# Patient Record
Sex: MALE | URBAN - METROPOLITAN AREA
[De-identification: names, ages, dates, MRNs, and addresses within clinical notes are randomized per-mention and may not be internally consistent; named-entity substitution may affect disease eponyms.]

---

## 2024-05-31 ENCOUNTER — HOSPITAL ENCOUNTER (INPATIENT)
Facility: MEDICAL CENTER | Age: 79
LOS: 2 days | DRG: 177 | End: 2024-06-02
Attending: INTERNAL MEDICINE | Admitting: HOSPITALIST
Payer: MEDICARE

## 2024-05-31 ENCOUNTER — APPOINTMENT (OUTPATIENT)
Dept: RADIOLOGY | Facility: MEDICAL CENTER | Age: 79
DRG: 177 | End: 2024-05-31
Attending: HOSPITALIST
Payer: MEDICARE

## 2024-05-31 ENCOUNTER — TELEPHONE (OUTPATIENT)
Dept: SLEEP MEDICINE | Facility: MEDICAL CENTER | Age: 79
End: 2024-05-31

## 2024-05-31 DIAGNOSIS — I10 ESSENTIAL HYPERTENSION: ICD-10-CM

## 2024-05-31 PROBLEM — J96.00 ACUTE RESPIRATORY FAILURE (HCC): Status: RESOLVED | Noted: 2024-05-31 | Resolved: 2024-05-31

## 2024-05-31 PROBLEM — J96.01 ACUTE HYPOXEMIC RESPIRATORY FAILURE (HCC): Status: ACTIVE | Noted: 2024-05-31

## 2024-05-31 PROBLEM — J96.01 ACUTE HYPOXEMIC RESPIRATORY FAILURE (HCC): Status: RESOLVED | Noted: 2024-05-31 | Resolved: 2024-05-31

## 2024-05-31 PROBLEM — D69.6 THROMBOCYTOPENIA (HCC): Status: ACTIVE | Noted: 2024-05-31

## 2024-05-31 PROBLEM — J12.82 PNEUMONIA DUE TO COVID-19 VIRUS: Status: ACTIVE | Noted: 2024-05-31

## 2024-05-31 PROBLEM — U07.1 PNEUMONIA DUE TO COVID-19 VIRUS: Status: ACTIVE | Noted: 2024-05-31

## 2024-05-31 PROBLEM — J96.00 ACUTE RESPIRATORY FAILURE (HCC): Status: ACTIVE | Noted: 2024-05-31

## 2024-05-31 PROBLEM — U07.1 COVID: Status: ACTIVE | Noted: 2024-05-31

## 2024-05-31 PROBLEM — I95.9 HYPOTENSION: Status: ACTIVE | Noted: 2024-05-31

## 2024-05-31 PROBLEM — R65.10 SIRS (SYSTEMIC INFLAMMATORY RESPONSE SYNDROME) (HCC): Status: ACTIVE | Noted: 2024-05-31

## 2024-05-31 LAB
D DIMER PPP IA.FEU-MCNC: 0.38 UG/ML (FEU) (ref 0–0.5)
INR PPP: 2.67 (ref 0.87–1.13)
PROTHROMBIN TIME: 29.7 SEC (ref 12–14.6)

## 2024-05-31 PROCEDURE — A9270 NON-COVERED ITEM OR SERVICE: HCPCS | Performed by: HOSPITALIST

## 2024-05-31 PROCEDURE — 85379 FIBRIN DEGRADATION QUANT: CPT

## 2024-05-31 PROCEDURE — 84145 PROCALCITONIN (PCT): CPT

## 2024-05-31 PROCEDURE — 85027 COMPLETE CBC AUTOMATED: CPT

## 2024-05-31 PROCEDURE — 83880 ASSAY OF NATRIURETIC PEPTIDE: CPT

## 2024-05-31 PROCEDURE — 71045 X-RAY EXAM CHEST 1 VIEW: CPT

## 2024-05-31 PROCEDURE — 85652 RBC SED RATE AUTOMATED: CPT

## 2024-05-31 PROCEDURE — 80048 BASIC METABOLIC PNL TOTAL CA: CPT

## 2024-05-31 PROCEDURE — 700102 HCHG RX REV CODE 250 W/ 637 OVERRIDE(OP): Performed by: HOSPITALIST

## 2024-05-31 PROCEDURE — 85610 PROTHROMBIN TIME: CPT

## 2024-05-31 PROCEDURE — 99223 1ST HOSP IP/OBS HIGH 75: CPT | Mod: AI | Performed by: HOSPITALIST

## 2024-05-31 PROCEDURE — 770022 HCHG ROOM/CARE - ICU (200)

## 2024-05-31 RX ORDER — ONDANSETRON 4 MG/1
4 TABLET, ORALLY DISINTEGRATING ORAL EVERY 4 HOURS PRN
Status: DISCONTINUED | OUTPATIENT
Start: 2024-05-31 | End: 2024-06-02 | Stop reason: HOSPADM

## 2024-05-31 RX ORDER — POLYETHYLENE GLYCOL 3350 17 G/17G
1 POWDER, FOR SOLUTION ORAL
Status: DISCONTINUED | OUTPATIENT
Start: 2024-05-31 | End: 2024-06-02 | Stop reason: HOSPADM

## 2024-05-31 RX ORDER — FLUOXETINE HYDROCHLORIDE 20 MG/1
40 CAPSULE ORAL DAILY
COMMUNITY

## 2024-05-31 RX ORDER — LEVOTHYROXINE SODIUM 0.07 MG/1
75 TABLET ORAL
COMMUNITY

## 2024-05-31 RX ORDER — OMEPRAZOLE 20 MG/1
20 CAPSULE, DELAYED RELEASE ORAL DAILY
Status: DISCONTINUED | OUTPATIENT
Start: 2024-06-01 | End: 2024-06-02 | Stop reason: HOSPADM

## 2024-05-31 RX ORDER — HYDROCHLOROTHIAZIDE 25 MG/1
12.5 TABLET ORAL DAILY
Status: ON HOLD | COMMUNITY
End: 2024-06-02

## 2024-05-31 RX ORDER — LISINOPRIL 20 MG/1
80 TABLET ORAL DAILY
Status: ON HOLD | COMMUNITY
End: 2024-06-02

## 2024-05-31 RX ORDER — LEVOTHYROXINE SODIUM 0.07 MG/1
75 TABLET ORAL
Status: DISCONTINUED | OUTPATIENT
Start: 2024-06-01 | End: 2024-06-02 | Stop reason: HOSPADM

## 2024-05-31 RX ORDER — WARFARIN SODIUM 10 MG/1
10 TABLET ORAL DAILY
COMMUNITY

## 2024-05-31 RX ORDER — ATORVASTATIN CALCIUM 10 MG/1
10 TABLET, FILM COATED ORAL DAILY
Status: DISCONTINUED | OUTPATIENT
Start: 2024-06-01 | End: 2024-06-02 | Stop reason: HOSPADM

## 2024-05-31 RX ORDER — AMOXICILLIN 250 MG
2 CAPSULE ORAL 2 TIMES DAILY
Status: DISCONTINUED | OUTPATIENT
Start: 2024-05-31 | End: 2024-06-02 | Stop reason: HOSPADM

## 2024-05-31 RX ORDER — FLUOXETINE HYDROCHLORIDE 20 MG/1
40 CAPSULE ORAL DAILY
Status: DISCONTINUED | OUTPATIENT
Start: 2024-06-01 | End: 2024-06-02 | Stop reason: HOSPADM

## 2024-05-31 RX ORDER — ONDANSETRON 2 MG/ML
4 INJECTION INTRAMUSCULAR; INTRAVENOUS EVERY 4 HOURS PRN
Status: DISCONTINUED | OUTPATIENT
Start: 2024-05-31 | End: 2024-06-02 | Stop reason: HOSPADM

## 2024-05-31 RX ORDER — ACETAMINOPHEN 325 MG/1
650 TABLET ORAL EVERY 6 HOURS PRN
Status: DISCONTINUED | OUTPATIENT
Start: 2024-05-31 | End: 2024-06-02 | Stop reason: HOSPADM

## 2024-05-31 RX ORDER — HYDROCODONE BITARTRATE AND ACETAMINOPHEN 10; 325 MG/1; MG/1
1-2 TABLET ORAL EVERY 6 HOURS PRN
COMMUNITY

## 2024-05-31 RX ORDER — ATORVASTATIN CALCIUM 10 MG/1
10 TABLET, FILM COATED ORAL NIGHTLY
COMMUNITY

## 2024-05-31 RX ORDER — PANTOPRAZOLE SODIUM 40 MG/1
40 TABLET, DELAYED RELEASE ORAL DAILY
COMMUNITY

## 2024-05-31 RX ORDER — WARFARIN SODIUM 7.5 MG/1
7.5 TABLET ORAL ONCE
Status: COMPLETED | OUTPATIENT
Start: 2024-05-31 | End: 2024-05-31

## 2024-05-31 RX ADMIN — WARFARIN SODIUM 7.5 MG: 7.5 TABLET ORAL at 23:27

## 2024-05-31 SDOH — ECONOMIC STABILITY: TRANSPORTATION INSECURITY
IN THE PAST 12 MONTHS, HAS LACK OF RELIABLE TRANSPORTATION KEPT YOU FROM MEDICAL APPOINTMENTS, MEETINGS, WORK OR FROM GETTING THINGS NEEDED FOR DAILY LIVING?: NO

## 2024-05-31 SDOH — ECONOMIC STABILITY: TRANSPORTATION INSECURITY
IN THE PAST 12 MONTHS, HAS THE LACK OF TRANSPORTATION KEPT YOU FROM MEDICAL APPOINTMENTS OR FROM GETTING MEDICATIONS?: NO

## 2024-05-31 ASSESSMENT — COGNITIVE AND FUNCTIONAL STATUS - GENERAL
SUGGESTED CMS G CODE MODIFIER DAILY ACTIVITY: CH
DAILY ACTIVITIY SCORE: 24
MOBILITY SCORE: 24
SUGGESTED CMS G CODE MODIFIER MOBILITY: CH

## 2024-05-31 ASSESSMENT — CHA2DS2 SCORE
SEX: MALE
CHA2DS2 VASC SCORE: 4
AGE 65 TO 74: NO
HYPERTENSION: YES
CHF OR LEFT VENTRICULAR DYSFUNCTION: NO
VASCULAR DISEASE: YES
DIABETES: NO
AGE 75 OR GREATER: YES
PRIOR STROKE OR TIA OR THROMBOEMBOLISM: NO

## 2024-05-31 ASSESSMENT — SOCIAL DETERMINANTS OF HEALTH (SDOH)

## 2024-05-31 ASSESSMENT — LIFESTYLE VARIABLES
AVERAGE NUMBER OF DAYS PER WEEK YOU HAVE A DRINK CONTAINING ALCOHOL: 3
HAVE PEOPLE ANNOYED YOU BY CRITICIZING YOUR DRINKING: NO
EVER FELT BAD OR GUILTY ABOUT YOUR DRINKING: NO
ON A TYPICAL DAY WHEN YOU DRINK ALCOHOL HOW MANY DRINKS DO YOU HAVE: 4
CONSUMPTION TOTAL: NEGATIVE
HOW MANY TIMES IN THE PAST YEAR HAVE YOU HAD 5 OR MORE DRINKS IN A DAY: 0
TOTAL SCORE: 0
DOES PATIENT WANT TO STOP DRINKING: NO
EVER HAD A DRINK FIRST THING IN THE MORNING TO STEADY YOUR NERVES TO GET RID OF A HANGOVER: NO
HAVE YOU EVER FELT YOU SHOULD CUT DOWN ON YOUR DRINKING: NO
TOTAL SCORE: 0
TOTAL SCORE: 0
ALCOHOL_USE: YES

## 2024-05-31 ASSESSMENT — PATIENT HEALTH QUESTIONNAIRE - PHQ9
SUM OF ALL RESPONSES TO PHQ9 QUESTIONS 1 AND 2: 0
1. LITTLE INTEREST OR PLEASURE IN DOING THINGS: NOT AT ALL
2. FEELING DOWN, DEPRESSED, IRRITABLE, OR HOPELESS: NOT AT ALL

## 2024-05-31 ASSESSMENT — PAIN DESCRIPTION - PAIN TYPE
TYPE: ACUTE PAIN
TYPE: ACUTE PAIN

## 2024-05-31 NOTE — TELEPHONE ENCOUNTER
"[facility] DIRECT ADMIT PROGRESS NOTE    Transferring facility: [facility]   Transferring provider: Meg    Chief complaint: COVID septic shock    Pertinent history & patient course:     78M treated for PNA 4 weeks ago treated with Zpac, had lingering cough but otherwise improves, came in for orthostatic hypotension. COVID+. AF, , hypotensive SBP 80s, given 2L bolus, remained hypotensive and vasopressors started. Room air.    Hx AF on coumadin INR 3.78, CLL WBC 29.5 historically 22-30    JOCELYN Cr 1.8 (baseline ~1)    CXR \"didn't show anything\"    Cardiac workup \"benign\", no changes, trop neg    CT chest: \"viral\" pneumonia    UA bland    Lactate pending    No echo prior    Pertinent imaging & lab results: see above    Consultants called prior to transfer and pertinent input from consultants: none    Code Status: Full code per transferring provider, I personally verified with the transferring provider patient's code status and the transferring provider has confirmed this with the patient.    Reason for Transfer: HLOC    Further work up or recommendations requested prior to transfer: none    Patient accepted for transfer: Yes    Accepting [facility]: [facility] to notify the admitting provider when patient arrives to the unit.    Consultants to be called upon arrival: noctensivist  Admission status: Inpatient.     [facility]    The admitting provider is the point of contact for questions or concerns regarding the patient's care.   __________  Gómez Yoo MD  Pulmonary and Critical Care Medicine  [facility]  "

## 2024-06-01 PROBLEM — R65.10 SIRS (SYSTEMIC INFLAMMATORY RESPONSE SYNDROME) (HCC): Status: RESOLVED | Noted: 2024-05-31 | Resolved: 2024-06-01

## 2024-06-01 PROBLEM — U07.1 COVID: Status: RESOLVED | Noted: 2024-05-31 | Resolved: 2024-06-01

## 2024-06-01 PROBLEM — D72.829 LEUCOCYTOSIS: Status: ACTIVE | Noted: 2024-06-01

## 2024-06-01 PROBLEM — I48.91 A-FIB (HCC): Status: ACTIVE | Noted: 2024-06-01

## 2024-06-01 LAB
ANION GAP SERPL CALC-SCNC: 13 MMOL/L (ref 7–16)
BUN SERPL-MCNC: 21 MG/DL (ref 8–22)
CALCIUM SERPL-MCNC: 8.1 MG/DL (ref 8.4–10.2)
CHLORIDE SERPL-SCNC: 105 MMOL/L (ref 96–112)
CO2 SERPL-SCNC: 18 MMOL/L (ref 20–33)
CREAT SERPL-MCNC: 0.94 MG/DL (ref 0.5–1.4)
ERYTHROCYTE [DISTWIDTH] IN BLOOD BY AUTOMATED COUNT: 52 FL (ref 35.9–50)
ERYTHROCYTE [SEDIMENTATION RATE] IN BLOOD BY WESTERGREN METHOD: 94 MM/HOUR (ref 0–20)
GFR SERPLBLD CREATININE-BSD FMLA CKD-EPI: 83 ML/MIN/1.73 M 2
GLUCOSE SERPL-MCNC: 197 MG/DL (ref 65–99)
HCT VFR BLD AUTO: 32.2 % (ref 42–52)
HGB BLD-MCNC: 10.3 G/DL (ref 14–18)
INR PPP: 2.74 (ref 0.87–1.13)
MCH RBC QN AUTO: 33.8 PG (ref 27–33)
MCHC RBC AUTO-ENTMCNC: 32 G/DL (ref 32.3–36.5)
MCV RBC AUTO: 105.6 FL (ref 81.4–97.8)
NT-PROBNP SERPL IA-MCNC: 709 PG/ML (ref 0–125)
PLATELET # BLD AUTO: 128 K/UL (ref 164–446)
PMV BLD AUTO: 10.5 FL (ref 9–12.9)
POTASSIUM SERPL-SCNC: 4.4 MMOL/L (ref 3.6–5.5)
PROCALCITONIN SERPL-MCNC: 0.11 NG/ML
PROTHROMBIN TIME: 30.3 SEC (ref 12–14.6)
RBC # BLD AUTO: 3.05 M/UL (ref 4.7–6.1)
SODIUM SERPL-SCNC: 136 MMOL/L (ref 135–145)
WBC # BLD AUTO: 29.4 K/UL (ref 4.8–10.8)

## 2024-06-01 PROCEDURE — 770001 HCHG ROOM/CARE - MED/SURG/GYN PRIV*

## 2024-06-01 PROCEDURE — 700102 HCHG RX REV CODE 250 W/ 637 OVERRIDE(OP): Performed by: HOSPITALIST

## 2024-06-01 PROCEDURE — 700111 HCHG RX REV CODE 636 W/ 250 OVERRIDE (IP): Mod: JZ | Performed by: HOSPITALIST

## 2024-06-01 PROCEDURE — 94760 N-INVAS EAR/PLS OXIMETRY 1: CPT

## 2024-06-01 PROCEDURE — 99232 SBSQ HOSP IP/OBS MODERATE 35: CPT | Performed by: INTERNAL MEDICINE

## 2024-06-01 PROCEDURE — 85610 PROTHROMBIN TIME: CPT

## 2024-06-01 PROCEDURE — A9270 NON-COVERED ITEM OR SERVICE: HCPCS | Performed by: INTERNAL MEDICINE

## 2024-06-01 PROCEDURE — A9270 NON-COVERED ITEM OR SERVICE: HCPCS | Performed by: HOSPITALIST

## 2024-06-01 PROCEDURE — 700102 HCHG RX REV CODE 250 W/ 637 OVERRIDE(OP): Performed by: INTERNAL MEDICINE

## 2024-06-01 RX ORDER — WARFARIN SODIUM 7.5 MG/1
7.5 TABLET ORAL
Status: COMPLETED | OUTPATIENT
Start: 2024-06-01 | End: 2024-06-01

## 2024-06-01 RX ORDER — FUROSEMIDE 10 MG/ML
40 INJECTION INTRAMUSCULAR; INTRAVENOUS ONCE
Status: COMPLETED | OUTPATIENT
Start: 2024-06-01 | End: 2024-06-01

## 2024-06-01 RX ORDER — UREA 10 %
5 LOTION (ML) TOPICAL NIGHTLY
Status: DISCONTINUED | OUTPATIENT
Start: 2024-06-01 | End: 2024-06-02 | Stop reason: HOSPADM

## 2024-06-01 RX ADMIN — ATORVASTATIN CALCIUM 10 MG: 10 TABLET, FILM COATED ORAL at 05:10

## 2024-06-01 RX ADMIN — LEVOTHYROXINE SODIUM 75 MCG: 0.07 TABLET ORAL at 05:10

## 2024-06-01 RX ADMIN — FLUOXETINE HYDROCHLORIDE 40 MG: 20 CAPSULE ORAL at 05:10

## 2024-06-01 RX ADMIN — Medication 5 MG: at 21:57

## 2024-06-01 RX ADMIN — WARFARIN SODIUM 7.5 MG: 7.5 TABLET ORAL at 17:33

## 2024-06-01 RX ADMIN — ACETAMINOPHEN 650 MG: 325 TABLET ORAL at 03:37

## 2024-06-01 RX ADMIN — ACETAMINOPHEN 650 MG: 325 TABLET ORAL at 10:48

## 2024-06-01 RX ADMIN — FUROSEMIDE 40 MG: 10 INJECTION INTRAMUSCULAR; INTRAVENOUS at 03:13

## 2024-06-01 RX ADMIN — OMEPRAZOLE 20 MG: 20 CAPSULE, DELAYED RELEASE ORAL at 05:10

## 2024-06-01 ASSESSMENT — PAIN DESCRIPTION - PAIN TYPE
TYPE: ACUTE PAIN
TYPE: ACUTE PAIN
TYPE: ACUTE PAIN;CHRONIC PAIN
TYPE: ACUTE PAIN;CHRONIC PAIN
TYPE: ACUTE PAIN
TYPE: ACUTE PAIN;CHRONIC PAIN
TYPE: ACUTE PAIN
TYPE: ACUTE PAIN

## 2024-06-01 ASSESSMENT — ENCOUNTER SYMPTOMS
DOUBLE VISION: 0
EYES NEGATIVE: 1
COUGH: 1
DEPRESSION: 0
NAUSEA: 0
GASTROINTESTINAL NEGATIVE: 1
NEUROLOGICAL NEGATIVE: 1
HEADACHES: 0
INSOMNIA: 0
BRUISES/BLEEDS EASILY: 0
CONSTITUTIONAL NEGATIVE: 1
SPUTUM PRODUCTION: 1
WEAKNESS: 0
CARDIOVASCULAR NEGATIVE: 1
FEVER: 0
NECK PAIN: 0
VOMITING: 0
MYALGIAS: 0
SORE THROAT: 0
BLURRED VISION: 0
SHORTNESS OF BREATH: 1
PSYCHIATRIC NEGATIVE: 1
CHILLS: 1
DIZZINESS: 0
PALPITATIONS: 0
RESPIRATORY NEGATIVE: 1
MUSCULOSKELETAL NEGATIVE: 1

## 2024-06-01 NOTE — PROGRESS NOTES
"[facility] DIRECT ADMIT PROGRESS NOTE     Transferring facility: [facility]   Transferring provider: Meg     Chief complaint: COVID septic shock     Pertinent history & patient course:      78M treated for PNA 4 weeks ago treated with Zpac, had lingering cough but otherwise improves, came in for orthostatic hypotension. COVID+. AF, , hypotensive SBP 80s, given 2L bolus, remained hypotensive and vasopressors started. Room air.     Hx AF on coumadin INR 3.78, CLL WBC 29.5 historically 22-30     JOCELYN Cr 1.8 (baseline ~1)     CXR \"didn't show anything\"     Cardiac workup \"benign\", no changes, trop neg     CT chest: \"viral\" pneumonia     UA bland     Lactate pending     No echo prior     Pertinent imaging & lab results: see above     Consultants called prior to transfer and pertinent input from consultants: none     Code Status: Full code per transferring provider, I personally verified with the transferring provider patient's code status and the transferring provider has confirmed this with the patient.     Reason for Transfer: HLOC     Further work up or recommendations requested prior to transfer: none     Patient accepted for transfer: Yes     Accepting [facility] Facility: [facility] Nursing to notify the admitting provider when patient arrives to the unit.     Consultants to be called upon arrival: noctensivist  Admission status: Inpatient.      [facility]     The admitting provider is the point of contact for questions or concerns regarding the patient's care.   __________  Gómez Yoo MD  Pulmonary and Critical Care Medicine  [facility]  "

## 2024-06-01 NOTE — PROGRESS NOTES
4 Eyes Skin Assessment Completed by PRO Hurd and PRO Sandra.    Head WDL  Ears WDL  Nose WDL  Mouth Ulcer(s), Sore, (fever blister)  Neck WDL  Breast/Chest WDL  Shoulder Blades WDL  Spine WDL  (R) Arm/Elbow/Hand Scab hand  (L) Arm/Elbow/Hand WDL  Abdomen WDL  Groin WDL  Scrotum/Coccyx/Buttocks WDL  (R) Leg Scab  (L) Leg Scar knee   (R) Heel/Foot/Toe Scar Big toe  (L) Heel/Foot/Toe Scar Big toe         Devices In Places Pulse Ox      Interventions In Place Pillows    Possible Skin Injury No    Pictures Uploaded Into Epic N/A  Wound Consult Placed N/A  RN Wound Prevention Protocol Ordered No

## 2024-06-01 NOTE — PROGRESS NOTES
Inpatient Anticoagulation Service Note for 5/31/2024      Reason for Anticoagulation: Atrial Fibrillation   [id number] VASc Score: 4  HAS-BLED Score: 1    Target INR: 2.0 to 3.0    INR from last 7 days       Date/Time INR Value    05/31/24 2140 2.67          Dose from last 7 days       Date/Time Dose (mg)    05/31/24 2212 7.5          Average Dose (mg): 10 (10mg daily prior to dose beint held due to INR 3.78)  Bridge Therapy: No    Reversal Agent Administered: Not Applicable    Comments: Patient ill with covid and JOCELYN, INR from transferring facility lab work was 3.78 and dose was held yesterday.    Plan:  give 7.5mg tonight and check INR with AM labs     Pharmacist suggested discharge dosing: TBD, dose will be adjusted based on INR until condition improves.     Trevon Washington, PharmD, BCPS

## 2024-06-01 NOTE — ASSESSMENT & PLAN NOTE
-Patient tested positive for COVID at outside facility.  -He is not currently requiring supplemental oxygen therefore I am not going to give him steroids.  -Patient had ESR and CRP elevated at outside facility.  I am adding D-dimer and procalcitonin to decide if needed to cover for superimposed bacterial disease.  -

## 2024-06-01 NOTE — CARE PLAN
The patient is Stable - Low risk of patient condition declining or worsening    Shift Goals  Clinical Goals: monitor vitals, comfort, plan for home  Patient Goals: get home  Family Goals: jessica    Progress made toward(s) clinical / shift goals:  Monitor vitals and insure that he is comfortable.  Will try to find a plan for patient to get a ride from family to get back to [place].     Patient is not progressing towards the following goals:      Problem: Knowledge Deficit - Standard  Goal: Patient and family/care givers will demonstrate understanding of plan of care, disease process/condition, diagnostic tests and medications  Outcome: Met     Problem: Hemodynamics  Goal: Patient's hemodynamics, fluid balance and neurologic status will be stable or improve  Outcome: Met     Problem: Fluid Volume  Goal: Fluid volume balance will be maintained  Outcome: Met     Problem: Urinary - Renal Perfusion  Goal: Ability to achieve and maintain adequate renal perfusion and functioning will improve  Outcome: Met     Problem: Respiratory  Goal: Patient will achieve/maintain optimum respiratory ventilation and gas exchange  Outcome: Met     Problem: Physical Regulation  Goal: Diagnostic test results will improve  Outcome: Met  Goal: Signs and symptoms of infection will decrease  Outcome: Met     Problem: Pain - Standard  Goal: Alleviation of pain or a reduction in pain to the patient’s comfort goal  Outcome: Met

## 2024-06-01 NOTE — ASSESSMENT & PLAN NOTE
-Patient is not taking medications for rate and rhythm control but he is on warfarin.  He does not have history of mechanical valve replacement or mitral disease, it is unclear if he could be taking a different anticoagulation as he seems to be somewhat overwhelmed by frequency of INR test he needs to do and adjustments of his warfarin dose.  -Pharmacy to dose INR.  At outside facility his INR was supratherapeutic at 3.53.

## 2024-06-01 NOTE — PROGRESS NOTES
ISOLATION PRECAUTIONS EDUCATION    Educated PATIENT, FAMILY, S.O: patient on isolation for COVID-19.    Educated on reason for isolation, how the infection may be transmitted, and how to help prevent transmission to others. Educated precautions involves staff and visitors wearing PPE, following Standard Precautions and performing meticulous hand hygiene in order to prevent transmission of infection.     Enhanced Droplet Precautions: Educated that Enhanced Droplet Precautions involves staff and visitors wearing a surgical mask when in the patient room.     In addition,  educated that they may leave their room, but prior to exiting the patient room each time, the patient needs to have on a fresh patient gown, a surgical mask must be worn by the patient while out of the patient room, and perform hand hygiene immediately prior to exiting the room.    Protective Precautions: Educated that Protective Precautions involves creating a protective environment for the patient and observing Standard Precautions.    Educated to keep door closed to help maintain the protective environment. No flowers or fruit will be allowed to enter the room. And visitors should not visit if they are ill or think they are becoming ill.    In addition, educated that they may leave their room, a surgical mask must be worn by the patient while out of the patient room.     Patient transport and mobilization on unit  Educated that they may leave their room, but prior to exiting, the patient needs to have on a fresh patient gown, ensure the potentially infectious area is covered, performing appropriate hand hygiene immediately prior to exiting the room.

## 2024-06-01 NOTE — PROGRESS NOTES
12-hour chart check complete.    Monitor Summary  Rhythm: A-fib  Rate:   Ectopy: rPVC  Measurements: --/.14/--

## 2024-06-01 NOTE — ASSESSMENT & PLAN NOTE
-ICU Admission  -Patient hypotensive at outside facility  -He received 3.8 L NS. BP now is normal  -Hold Lisinopril, HCTZ.  -He has some rales on bilateral lung bases and added BNP and Portable chest x Ray -I will add 40 mg of IV Lasix now for elevated proBNP and Rales on physical exam.  Will need to closely monitor his blood pressure as he might become hypotensive.  -IF BP stable, can be downgraded to the tele in am

## 2024-06-01 NOTE — ASSESSMENT & PLAN NOTE
SIRS criteria identified on my evaluation include:  Tachycardia, with heart rate greater than 90 BPM and Leukocytosis, with WBC greater than 12,000  SIRS secondary to COVID Pneumonia.  Patient does have a history of CLL and elevated WBC could be related to this.

## 2024-06-01 NOTE — H&P
Hospital Medicine History & Physical Note    Date of Service  5/31/2024    Primary Care Physician  No primary care provider on file.    Consultants  None    Code Status  Full Code    Chief Complaint  Direct Admission from [facility] with COVID, respiratory failure and Septic Shock    History of Presenting Illness  Karlee Romero is a 78 y.o. male, with h/o CLL, HTN, Hypothyroidism and Afib on warfarin, who is coming to the ICU as a Direct Admission from [facility] with COVID, respiratory failure and Septic Shock on 5/31/2024. He was hypotensive at outside facility with SBP 80 mmHg, therefore accepted to the ICU. Patient is feeling unwell now for 1 week. He reports that he was at the hospital at [facility] with his wife multiple times 2 weeks ago as she had mitral clip done. Started having upper respiratory symptoms 7 days ago that improved with over the counter medication. He also reports poor appetite and loss of taste reason why he lost 12 lbs in 1 week as he is not eating much. He was feeling sick and weak today reason why he went to outside facility hospital for evaluation.     He received 3.8 L NS, and did not required vasopressors. Continues to be on room air and at the time of my evaluation does not have any complaints.     Given at outside facility:  -NS 3.8 L  -Dexamethasone  4 mg  -Azithromycin 500 mg      I discussed the plan of care with patient.    Review of Systems  Review of Systems   Constitutional:  Positive for chills and malaise/fatigue. Negative for fever.   HENT:  Negative for congestion and sore throat.    Eyes:  Negative for blurred vision and double vision.   Respiratory:  Positive for cough, sputum production and shortness of breath.    Cardiovascular:  Negative for chest pain and palpitations.   Gastrointestinal:  Negative for nausea and vomiting.   Genitourinary:  Negative for dysuria and urgency.   Musculoskeletal:  Negative for myalgias and neck pain.   Skin:  Negative for  itching and rash.   Neurological:  Negative for dizziness, weakness and headaches.   Endo/Heme/Allergies:  Does not bruise/bleed easily.   Psychiatric/Behavioral:  Negative for depression. The patient does not have insomnia.        Past Medical History   has a past medical history of Atrial fibrillation (HCC), Chronic pain disorder, CLL (chronic lymphocytic leukemia) (HCC), Depression, GERD (gastroesophageal reflux disease), Hypercholesteremia, Hypertension, and Hypothyroidism.    Surgical History  Denies recent surgical history    Family History  Reviewed and not pertinent  Family history reviewed with patient. There is no family history that is pertinent to the chief complaint.     Social History       Allergies  No Known Allergies    Medications  Prior to Admission Medications   Prescriptions Last Dose Informant Patient Reported? Taking?   FLUoxetine (PROZAC) 20 MG Cap 5/31/2024 at 0800  Yes Yes   Sig: Take 40 mg by mouth every day.   HYDROcodone/acetaminophen (NORCO)  MG Tab 5/31/2024 at 0800  Yes Yes   Sig: Take 1-2 Tablets by mouth every 6 hours as needed for Moderate Pain.   atorvastatin (LIPITOR) 10 MG Tab 5/31/2024 at 0800  Yes Yes   Sig: Take 10 mg by mouth every evening.   hydroCHLOROthiazide 25 MG Tab 5/31/2024 at 0800  Yes Yes   Sig: Take 12.5 mg by mouth every day.   levothyroxine (SYNTHROID) 75 MCG Tab 5/31/2024 at 0800  Yes Yes   Sig: Take 75 mcg by mouth every morning on an empty stomach.   lisinopril (PRINIVIL) 20 MG Tab 5/31/2024 at 0800  Yes Yes   Sig: Take 80 mg by mouth every day.   pantoprazole (PROTONIX) 40 MG Tablet Delayed Response 5/31/2024  Yes Yes   Sig: Take 40 mg by mouth every day.   warfarin (COUMADIN) 10 MG Tab 5/29/2024 at 0800  Yes Yes   Sig: Take 10 mg by mouth every day. Indications: Atrial Fibrillation      Facility-Administered Medications: None       Physical Exam  Temp:  [36.1 °C (97 °F)] 36.1 °C (97 °F)  Pulse:  [90] 90  Resp:  [16] 16  BP: (135)/(73) 135/73  SpO2:   [91 %] 91 %  Blood Pressure : 135/73   Temperature: 36.1 °C (97 °F)   Pulse: 90   Respiration: 16   Pulse Oximetry: 91 %       Physical Exam  Constitutional:       Appearance: Normal appearance.   HENT:      Head: Normocephalic and atraumatic.      Nose: Nose normal.      Mouth/Throat:      Mouth: Mucous membranes are moist.      Pharynx: Oropharynx is clear.   Eyes:      Extraocular Movements: Extraocular movements intact.      Pupils: Pupils are equal, round, and reactive to light.   Cardiovascular:      Rate and Rhythm: Normal rate and regular rhythm.      Pulses: Normal pulses.      Heart sounds: Normal heart sounds.   Pulmonary:      Effort: Pulmonary effort is normal.      Breath sounds: Rales present.   Abdominal:      General: Abdomen is flat. Bowel sounds are normal.      Palpations: Abdomen is soft.   Musculoskeletal:      Cervical back: Normal range of motion and neck supple.   Skin:     General: Skin is warm and dry.   Neurological:      General: No focal deficit present.      Mental Status: He is alert and oriented to person, place, and time.   Psychiatric:         Mood and Affect: Mood normal.         Behavior: Behavior normal.         Laboratory:          Outside facility laboratory:  CBC:WBC: 29.5, Hgb 11.8, Platelets 152  CMP: Na 137, K 3.5, Chloride 102, Glucose 132, BUN 25, Creatinine 1.8, T Bili 0.7, AST 13, ALT 16  Magnesium 2.1  ESR: 95  CRP: 156.4    Troponin: Negative  TSH 3.78  PT: 35.5/ INR 3.53  UA: Negative    Imaging:  Outside facility Imaging:  CT Chest WO:  IMPRESSION:  Tree in bud patchy ground glass/ alveolar infiltrate throughout the right upper lobe shown on the previous exam has resolved. There are new more subtle patchy ground-glass infiltrates throughout both lungs consistent with recurrent pneumonia, likely viral pneumonia which could be include COVID.  Stable mild fusiform aneurysmal dilation of the ascending aorta at 4.1 cm diameter.  Prior cholecystectomy    Chest  Xray:  IMPRESSION: No acute cardiopulmonary process     Outside facility EKG: I personally reviewed and this is my interpretation.    Afib at 98 bpm with frequent PVCs and RBBB.     I extensively review medical records provided by outside facility    Assessment/Plan:  Justification for Admission Status  I anticipate this patient will require at least two midnights for appropriate medical management, necessitating inpatient admission because 78-year-old male, who was excepted to the ICU with COVID-pneumonia and hypotension with need of vasopressors or for septic shock however now without any requirement of it.  He received significant amount of IV fluids and now will require diuresis.      * Hypotension  Assessment & Plan  -ICU Admission  -Patient hypotensive at outside facility  -He received 3.8 L NS. BP now is normal  -Hold Lisinopril, HCTZ.  -He has some rales on bilateral lung bases and added BNP and Portable chest x Ray -I will add 40 mg of IV Lasix now for elevated proBNP and Rales on physical exam.  Will need to closely monitor his blood pressure as he might become hypotensive.  -IF BP stable, can be downgraded to the [facility] in am      Pneumonia due to COVID-19 virus  Assessment & Plan  -Patient tested positive for COVID at outside facility.  -He is not currently requiring supplemental oxygen therefore I am not going to give him steroids.  -Patient had ESR and CRP elevated at outside facility.  I am adding D-dimer and procalcitonin to decide if needed to cover for superimposed bacterial disease.  -    SIRS (systemic inflammatory response syndrome) ([facility])  Assessment & Plan  SIRS criteria identified on my evaluation include:  Tachycardia, with heart rate greater than 90 BPM and Leukocytosis, with WBC greater than 12,000  SIRS secondary to COVID Pneumonia.  Patient does have a history of CLL and elevated WBC could be related to this.      A-fib (HCC)  Assessment & Plan  -Patient is not taking medications for rate  and rhythm control but he is on warfarin.  He does not have history of mechanical valve replacement or mitral disease, it is unclear if he could be taking a different anticoagulation as he seems to be somewhat overwhelmed by frequency of INR test he needs to do and adjustments of his warfarin dose.  -Pharmacy to dose INR.  At outside facility his INR was supratherapeutic at 3.53.    Thrombocytopenia (HCC)  Assessment & Plan  -Platelets 152 at outside facility. This is likely secondary to COVID  -Monitor CBC in am        VTE prophylaxis: SCDs/TEDs

## 2024-06-01 NOTE — PROGRESS NOTES
Received pt's report from ICU RN. Pt transferred via WC with a transport, able to walk to bed and bathroom, not in any distress on RA at 93%. AAOx4. Oriented to floor. Discussed POC. Isolation kept in place. Fall risk precautions in place, locked bed in lowest position and call light within reach. All needs met at this time. Hourly rounding in place.

## 2024-06-01 NOTE — PROGRESS NOTES
4 Eyes Skin Assessment Completed by Fifi, PRO and PRO Andrade.    Head WDL  Ears WDL  Nose WDL  Mouth Redness, scabbed (fever blister)  Neck WDL  Breast/Chest WDL  Shoulder Blades WDL  Spine WDL  (R) Arm/Elbow/Hand Scab on hand  (L) Arm/Elbow/Hand WDL  Abdomen WDL  Groin WDL  Scrotum/Coccyx/Buttocks WDL  (R) Leg WDL  (L) Leg WDL  (R) Heel/Foot/Toe WDL  (L) Heel/Foot/Toe WDL          Devices In Places ECG, Tele Box, Blood Pressure Cuff, and Pulse Ox      Interventions In Place Pillows and Low Air Loss Mattress    Possible Skin Injury No    Pictures Uploaded Into Epic N/A  Wound Consult Placed N/A  RN Wound Prevention Protocol Ordered No

## 2024-06-01 NOTE — PROGRESS NOTES
"The patient is doing great.  Maintains spo2 is 95% on room air.  \"Feels pretty good this morning other then feeling a little nauseated\".  Patient is going to try and eat some breakfast this morning to see if it is just because he is hungary.  Patient not currently on any drips.  Patient is sitting up on side of bed trying to eat breakfast.  Placed IP consult for case management to look into transferring back to [place].   "

## 2024-06-01 NOTE — PROGRESS NOTES
"Pulmonary Progress Note    Date of admission  5/31/2024    Chief Complaint  78 y.o. male admitted 5/31/2024 with cough    Hospital Course  \"Karlee Romero is a 78 y.o. male, with h/o CLL, HTN, Hypothyroidism and Afib on warfarin, who is coming to the ICU as a Direct Admission from [facility] with COVID, respiratory failure and Septic Shock on 5/31/2024. He was hypotensive at outside facility with SBP 80 mmHg, therefore accepted to the ICU. Patient is feeling unwell now for 1 week. He reports that he was at the hospital at [facility] with his wife multiple times 2 weeks ago as she had mitral clip done. Started having upper respiratory symptoms 7 days ago that improved with over the counter medication. He also reports poor appetite and loss of taste reason why he lost 12 lbs in 1 week as he is not eating much. He was feeling sick and weak today reason why he went to outside facility hospital for evaluation.      He received 3.8 L NS, and did not required vasopressors. Continues to be on room air and at the time of my evaluation does not have any complaints.      Given at outside facility:  -NS 3.8 L  -Dexamethasone  4 mg  -Azithromycin 500 mg\" From Dr. Monae's note    + COVID from outside hospital    Interval Problem Update  Reviewed last 24 hour events:  No need for pressors  BP has been stable  Procal 0.11      Review of Systems  Review of Systems   Constitutional: Negative.    HENT: Negative.     Eyes: Negative.    Respiratory: Negative.     Cardiovascular: Negative.    Gastrointestinal: Negative.    Genitourinary: Negative.    Musculoskeletal: Negative.    Skin: Negative.    Neurological: Negative.    Endo/Heme/Allergies: Negative.    Psychiatric/Behavioral: Negative.          Vital Signs for last 24 hours   Temp:  [35.9 °C (96.7 °F)-36.7 °C (98 °F)] 36.7 °C (98 °F)  Pulse:  [] 97  Resp:  [15-50] 50  BP: ()/(55-84) 117/64  SpO2:  [90 %-97 %] 95 %          Physical Exam   Physical Exam  HENT:      " Head: Normocephalic and atraumatic.      Mouth/Throat:      Mouth: Mucous membranes are moist.   Eyes:      Extraocular Movements: Extraocular movements intact.      Pupils: Pupils are equal, round, and reactive to light.   Cardiovascular:      Rate and Rhythm: Normal rate. Rhythm irregular.   Pulmonary:      Effort: Pulmonary effort is normal.      Breath sounds: Normal breath sounds.   Musculoskeletal:      Cervical back: Normal range of motion.   Skin:     General: Skin is warm and dry.   Neurological:      General: No focal deficit present.      Mental Status: He is alert and oriented to person, place, and time.   Psychiatric:         Mood and Affect: Mood normal.         Behavior: Behavior normal.         Thought Content: Thought content normal.         Judgment: Judgment normal.         Medications  Current Facility-Administered Medications   Medication Dose Route Frequency Provider Last Rate Last Admin    warfarin (Coumadin) tablet 7.5 mg  7.5 mg Oral ONCE AT 1800 Delmis Lopez M.D.        atorvastatin (Lipitor) tablet 10 mg  10 mg Oral DAILY Varsha Silva M.D.   10 mg at 06/01/24 0510    FLUoxetine (PROzac) capsule 40 mg  40 mg Oral DAILY Varsha Silva M.D.   40 mg at 06/01/24 0510    levothyroxine (Synthroid) tablet 75 mcg  75 mcg Oral AM ES Varsha Silva M.D.   75 mcg at 06/01/24 0510    omeprazole (PriLOSEC) capsule 20 mg  20 mg Oral DAILY Varsha Silva M.D.   20 mg at 06/01/24 0510    acetaminophen (Tylenol) tablet 650 mg  650 mg Oral Q6HRS PRN Varsha Silva M.D.   650 mg at 06/01/24 0337    senna-docusate (Pericolace Or Senokot S) 8.6-50 MG per tablet 2 Tablet  2 Tablet Oral BID Varsha Silva M.D.        And    polyethylene glycol/lytes (Miralax) Packet 1 Packet  1 Packet Oral QDAY PRN Varsha Silva M.D.        ondansetron (Zofran) syringe/vial injection 4 mg  4 mg Intravenous Q4HRS PRN Varsha Silva M.D.         ondansetron (Zofran ODT) dispertab 4 mg  4 mg Oral Q4HRS PRN Varsha Silva M.D.        MD Alert...Warfarin per Pharmacy   Other PHARMACY TO DOSE Varsha Silva M.D.           Fluids    Intake/Output Summary (Last 24 hours) at 6/1/2024 1032  Last data filed at 6/1/2024 0600  Gross per 24 hour   Intake 1400 ml   Output 1800 ml   Net -400 ml       Laboratory          Recent Labs     05/31/24  2323   SODIUM 136   POTASSIUM 4.4   CHLORIDE 105   CO2 18*   BUN 21   CREATININE 0.94   CALCIUM 8.1*     Recent Labs     05/31/24  2323   GLUCOSE 197*     Recent Labs     05/31/24  2323   WBC 29.4*     Recent Labs     05/31/24  2140 05/31/24  2323 06/01/24  0317   RBC  --  3.05*  --    HEMOGLOBIN  --  10.3*  --    HEMATOCRIT  --  32.2*  --    PLATELETCT  --  128*  --    PROTHROMBTM 29.7*  --  30.3*   INR 2.67*  --  2.74*       Imaging  Reviewed see above    Assessment/Plan  * Hypotension- (present on admission)  Assessment & Plan  Likely dehydration as resolved    Pneumonia due to COVID-19 virus- (present on admission)  Assessment & Plan  Room Air  No indication for Abx at this time and no steroids      Leucocytosis- (present on admission)  Assessment & Plan  From dexamethasone and covid  follow    A-fib (HCC)- (present on admission)  Assessment & Plan  On coumadin    Thrombocytopenia (HCC)- (present on admission)  Assessment & Plan  Assume due to covid  monitor         VTE:  Coumadin  Ulcer: on PPI as outpt also   Lines: None    I have performed a physical exam and reviewed and updated ROS and Plan today (6/1/2024). In review of yesterday's note (5/31/2024), there are no changes except as documented above.     Discussed patient condition and risk of morbidity and/or mortality with Pharmacy, Charge nurse / hot rounds, and Patient    Tx to medical and likely can be discharged tomorrow if transport arranged

## 2024-06-01 NOTE — CARE PLAN
The patient is Stable - Low risk of patient condition declining or worsening    Shift Goals  Clinical Goals: Afebrile, SBP >90, SpO2 >90%  Patient Goals: Feel better  Family Goals: JUAN JOSE    Progress made toward(s) clinical / shift goals:  Patient has been afebrile during night shift. SBP has been stable in the 100s-110s and required 2L supplemental oxygen while sleeping.    Patient is not progressing towards the following goals: N/A

## 2024-06-01 NOTE — ASSESSMENT & PLAN NOTE
SIRS criteria identified on my evaluation include:  Leukocytosis, with WBC greater than 12,000  SIRS is non-infectious, the patient does not have sepsis  S/p

## 2024-06-01 NOTE — PROGRESS NOTES
Inpatient Anticoagulation Service Note    Date: 6/1/2024  Reason for Anticoagulation: Atrial Fibrillation   [id number] VASc Score: 4  HAS-BLED Score: 1    Hemoglobin Value: (!) 10.3  Hematocrit Value: (!) 32.2  Lab Platelet Value: (!) 128  Target INR: 2.0 to 3.0    INR from last 7 days       Date/Time INR Value    06/01/24 0317 2.74    05/31/24 2140 2.67          Dose from last 7 days       Date/Time Dose (mg)    06/01/24 0956 7.5    05/31/24 2212 7.5          Average Dose (mg): Home regimen: warfarin 10 mg daily. Dose omitted 5/30 for INR 3.53.    Bridge Therapy: No     Reversal Agent Administered: Not Applicable    Comments: Direct transfer from OSH for Covid and hypotension. Downgrading from ICU today. Remains on room air with cardiac diet ordered. Supratherapeutic INR at OSH with warfarin held 5/30.    Plan:  Warfarin 7.5 mg today. Pharmacy to trend INR for when able to transition back to home dose 10 mg.    Education Material Provided?:  (Chronic warfarin therapy)    Pharmacist suggested discharge dosing: If INR stable, home regimen with INR follow up within 48 hours of discharge. If INR elevated, please call pharmacy for recommendation on reduced dosing until INR follow up with anti-coag clinic.     Dallas Hale, PharmD

## 2024-06-02 VITALS
TEMPERATURE: 97 F | BODY MASS INDEX: 29.51 KG/M2 | SYSTOLIC BLOOD PRESSURE: 114 MMHG | RESPIRATION RATE: 17 BRPM | HEIGHT: 70 IN | DIASTOLIC BLOOD PRESSURE: 71 MMHG | WEIGHT: 206.13 LBS | OXYGEN SATURATION: 96 % | HEART RATE: 76 BPM

## 2024-06-02 LAB
ANISOCYTOSIS BLD QL SMEAR: ABNORMAL
BASOPHILS # BLD AUTO: 0 % (ref 0–1.8)
BASOPHILS # BLD: 0 K/UL (ref 0–0.12)
EOSINOPHIL # BLD AUTO: 0 K/UL (ref 0–0.51)
EOSINOPHIL NFR BLD: 0 % (ref 0–6.9)
ERYTHROCYTE [DISTWIDTH] IN BLOOD BY AUTOMATED COUNT: 51.7 FL (ref 35.9–50)
HCT VFR BLD AUTO: 32.3 % (ref 42–52)
HGB BLD-MCNC: 10.1 G/DL (ref 14–18)
INR PPP: 3.19 (ref 0.87–1.13)
LYMPHOCYTES # BLD AUTO: 19.67 K/UL (ref 1–4.8)
LYMPHOCYTES NFR BLD: 95 % (ref 22–41)
MACROCYTES BLD QL SMEAR: ABNORMAL
MANUAL DIFF BLD: NORMAL
MCH RBC QN AUTO: 33 PG (ref 27–33)
MCHC RBC AUTO-ENTMCNC: 31.3 G/DL (ref 32.3–36.5)
MCV RBC AUTO: 105.6 FL (ref 81.4–97.8)
MONOCYTES # BLD AUTO: 0 K/UL (ref 0–0.85)
MONOCYTES NFR BLD AUTO: 0 % (ref 0–13.4)
NEUTROPHILS # BLD AUTO: 1.04 K/UL (ref 1.82–7.42)
NEUTROPHILS NFR BLD: 5 % (ref 44–72)
NRBC # BLD AUTO: 0 K/UL
NRBC BLD-RTO: 0 /100 WBC (ref 0–0.2)
PLATELET # BLD AUTO: 122 K/UL (ref 164–446)
PLATELET BLD QL SMEAR: NORMAL
PMV BLD AUTO: 10.4 FL (ref 9–12.9)
PROTHROMBIN TIME: 34.1 SEC (ref 12–14.6)
RBC # BLD AUTO: 3.06 M/UL (ref 4.7–6.1)
RBC BLD AUTO: PRESENT
SMUDGE CELLS BLD QL SMEAR: NORMAL
WBC # BLD AUTO: 20.7 K/UL (ref 4.8–10.8)

## 2024-06-02 PROCEDURE — 85007 BL SMEAR W/DIFF WBC COUNT: CPT

## 2024-06-02 PROCEDURE — 85027 COMPLETE CBC AUTOMATED: CPT

## 2024-06-02 PROCEDURE — 700102 HCHG RX REV CODE 250 W/ 637 OVERRIDE(OP): Performed by: HOSPITALIST

## 2024-06-02 PROCEDURE — 85610 PROTHROMBIN TIME: CPT

## 2024-06-02 PROCEDURE — 36415 COLL VENOUS BLD VENIPUNCTURE: CPT

## 2024-06-02 PROCEDURE — 99239 HOSP IP/OBS DSCHRG MGMT >30: CPT | Performed by: INTERNAL MEDICINE

## 2024-06-02 PROCEDURE — 94760 N-INVAS EAR/PLS OXIMETRY 1: CPT

## 2024-06-02 PROCEDURE — A9270 NON-COVERED ITEM OR SERVICE: HCPCS | Performed by: HOSPITALIST

## 2024-06-02 RX ORDER — LISINOPRIL 10 MG/1
20 TABLET ORAL
Qty: 30 TABLET | Refills: 0
Start: 2024-06-02

## 2024-06-02 RX ADMIN — FLUOXETINE HYDROCHLORIDE 40 MG: 20 CAPSULE ORAL at 05:38

## 2024-06-02 RX ADMIN — OMEPRAZOLE 20 MG: 20 CAPSULE, DELAYED RELEASE ORAL at 05:38

## 2024-06-02 RX ADMIN — ATORVASTATIN CALCIUM 10 MG: 10 TABLET, FILM COATED ORAL at 05:38

## 2024-06-02 RX ADMIN — SENNOSIDES AND DOCUSATE SODIUM 2 TABLET: 50; 8.6 TABLET ORAL at 05:38

## 2024-06-02 RX ADMIN — LEVOTHYROXINE SODIUM 75 MCG: 0.07 TABLET ORAL at 05:38

## 2024-06-02 ASSESSMENT — COGNITIVE AND FUNCTIONAL STATUS - GENERAL
DAILY ACTIVITIY SCORE: 24
SUGGESTED CMS G CODE MODIFIER MOBILITY: CI
MOBILITY SCORE: 23
WALKING IN HOSPITAL ROOM: A LITTLE
SUGGESTED CMS G CODE MODIFIER DAILY ACTIVITY: CH

## 2024-06-02 ASSESSMENT — PAIN DESCRIPTION - PAIN TYPE: TYPE: ACUTE PAIN

## 2024-06-02 NOTE — PROGRESS NOTES
Received bedside report from morning RN, resumed care of pt. Pt awake and alert, resting comfortably in bed, resp even and unlabored no s/s of resp distress. No complaints at this time, no report of pain/discomfort. Isolation precautions in place. Call light within reach. Hourly rounding in place.

## 2024-06-02 NOTE — PROGRESS NOTES
Inpatient Anticoagulation Service Note for 2024      Reason for Anticoagulation: Atrial Fibrillation   [id number] VASc Score: 4  HAS-BLED Score: 1    Hemoglobin Value: (!) 10.1  Hematocrit Value: (!) 32.3  Lab Platelet Value: (!) 122  Target INR: 2.0 to 3.0    INR from last 7 days       Date/Time INR Value    24 0127 3.19    24 0317 2.74    24 2140 2.67          Dose from last 7 days       Date/Time Dose (mg)    24 0900 0    24 0956 7.5    24 2212 7.5          Average Dose (mg): 10 (10mg daily prior to dose beint held due to INR 3.78)  Bridge Therapy: No     Reversal Agent Administered: Not Applicable  Comments: Direct transfer from OSH for Covid and hypotension.     Plan:  INR high today, his dose was held 5/30 then 7.5 mg x 2 days which is lower than his home regimen. INR likely high d/t illness, decreased intake. Will hold warfarin today as INR is trending up. And anticipate it to continue increasing.     Education Material Provided?:  (Chronic warfarin therapy)    Pharmacist suggested discharge dosin.5 mg daily, INR within 3 days of discharge     Tiesha Hernandez, PharmD

## 2024-06-02 NOTE — CARE PLAN
The patient is Stable - Low risk of patient condition declining or worsening    Shift Goals  Clinical Goals: Pt's VS will be monitored, free from falls during this shift  Patient Goals: Able rest comfortably, dc home  Family Goals: n/a    Progress made toward(s) clinical / shift goals:  Pt VS remained stable. Denies any pain or nausea. Tolerated diet. Pt did not sustain any fall during this shift.     Patient is not progressing towards the following goals:

## 2024-06-02 NOTE — PROGRESS NOTES
Discharged per MD order in a stable condition. Discussed discharge summary, medication list and follow up with pt, verbalized understanding. Answered all questions. Removed IV. All belongings accounted for. Transported via WC with an escort and son in law.

## 2024-06-02 NOTE — DISCHARGE SUMMARY
Discharge Summary    CHIEF COMPLAINT ON ADMISSION  No chief complaint on file.      Reason for Admission  Septic COVID pneumonia     Admission Date  5/31/2024    CODE STATUS  Prior    HPI & HOSPITAL COURSE  Patient is a pleasant 78-year-old male with history of CLL hypertension hypothyroid A-fib on warfarin came into ICU direct admission from [facility] with new diagnosis of COVID.  Concern of septic shock and hypotension.  Outside facility showed his blood pressure was 80 mmHg and he was transferred into the ICU.  He had recently been at [facility] with his wife multiple x 2 weeks prior to getting a mitral clip placed.  He started having symptoms 7 days prior and started over-the-counter medication with symptoms of poor appetite and lost of taste.  He lost 12 pounds in 1 week.  He was feeling sick and went to the outside hospital.  He received 3.8 L normal saline no vasopressor and remained on room air.  Patient was transferred to [facility] to the ICU and continued on IV fluids and no need for pressors.  Patient was monitored for 24 hours in the ICU and downgraded to the medical floor.  He remains on room air and is doing well.  I have recommended that he hold off on his hydrochlorothiazide and his lisinopril 20 mg.  I have instructed him to stop taking hydrochlorothiazide and to take his blood pressure in the morning and if his systolic blood pressure is greater than 140 to take his lisinopril.  I recommend that he follow-up with his primary care practitioner with his blood pressure results to see if his blood pressure goes back to normal versus elevated.  Patient states understanding and is agreeable with this plan.  Patient appropriate for discharge at this time.    Therefore, he is discharged in good and stable condition to home with close outpatient follow-up.    The patient met 2-midnight criteria for an inpatient stay at the time of discharge.    Discharge Date  6/2/2024    FOLLOW UP ITEMS POST  DISCHARGE  PCP, anticoagulation clinic    DISCHARGE DIAGNOSES  Principal Problem:    Hypotension (POA: Yes)  Active Problems:    Thrombocytopenia (HCC) (POA: Yes)    Pneumonia due to COVID-19 virus (POA: Yes)    A-fib (HCC) (POA: Yes)    Leucocytosis (POA: Yes)  Resolved Problems:    COVID (POA: Yes)    Acute hypoxemic respiratory failure (HCC) (POA: Yes)    Acute respiratory failure (HCC) (POA: Yes)    SIRS (systemic inflammatory response syndrome) (HCC) (POA: Yes)      FOLLOW UP  No future appointments.  No follow-up provider specified.    MEDICATIONS ON DISCHARGE     Medication List        CHANGE how you take these medications        Instructions   lisinopril 10 MG Tabs  What changed:   medication strength  how much to take  when to take this  reasons to take this  Commonly known as: Prinivil   Take 2 Tablets by mouth 1 time a day as needed (sbp >140).  Dose: 20 mg            CONTINUE taking these medications        Instructions   atorvastatin 10 MG Tabs  Commonly known as: Lipitor   Take 10 mg by mouth every evening.  Dose: 10 mg     FLUoxetine 20 MG Caps  Commonly known as: PROzac   Take 40 mg by mouth every day.  Dose: 40 mg     HYDROcodone/acetaminophen  MG Tabs  Commonly known as: Norco   Take 1-2 Tablets by mouth every 6 hours as needed for Moderate Pain.  Dose: 1-2 Tablet     levothyroxine 75 MCG Tabs  Commonly known as: Synthroid   Take 75 mcg by mouth every morning on an empty stomach.  Dose: 75 mcg     pantoprazole 40 MG Tbec  Commonly known as: Protonix   Take 40 mg by mouth every day.  Dose: 40 mg     warfarin 10 MG Tabs  Commonly known as: Coumadin   Take 10 mg by mouth every day. Indications: Atrial Fibrillation  Dose: 10 mg            STOP taking these medications      hydroCHLOROthiazide 25 MG Tabs              Allergies  No Known Allergies    DIET  No orders of the defined types were placed in this encounter.      ACTIVITY  As tolerated.  Weight bearing as  tolerated    CONSULTATIONS  Critical care-Dr. Martinez    PROCEDURES  None    LABORATORY  Lab Results   Component Value Date    SODIUM 136 05/31/2024    POTASSIUM 4.4 05/31/2024    CHLORIDE 105 05/31/2024    CO2 18 (L) 05/31/2024    GLUCOSE 197 (H) 05/31/2024    BUN 21 05/31/2024    CREATININE 0.94 05/31/2024        Lab Results   Component Value Date    WBC 20.7 (H) 06/02/2024    HEMOGLOBIN 10.1 (L) 06/02/2024    HEMATOCRIT 32.3 (L) 06/02/2024    PLATELETCT 122 (L) 06/02/2024        Total time of the discharge process exceeds 36 minutes.

## 2024-06-02 NOTE — CARE PLAN
The patient is Stable - Low risk of patient condition declining or worsening    Shift Goals  Clinical Goals: monitor vitals, pt will be able to rest atleast 4-5 hours throughout the night  Patient Goals: d/c home  Family Goals: jessica    Progress made toward(s) clinical / shift goals: pt sleep 4 hours throughout the night seen asleep during rounds, v/s within pt baseline.    Patient is not progressing towards the following goals:

## 2024-06-02 NOTE — PROGRESS NOTES
BSSR received from night RN. Pt resting comfortably in bed not in any distress on RA at 95%. AAOx4. Denies pain or N/V. Discussed POC. Isolation kept in place. Fall risk precautions in place, locked bed in lowest position and call light within reach. All needs met at this time. Hourly rounding in place.